# Patient Record
Sex: MALE | Race: WHITE | NOT HISPANIC OR LATINO | URBAN - METROPOLITAN AREA
[De-identification: names, ages, dates, MRNs, and addresses within clinical notes are randomized per-mention and may not be internally consistent; named-entity substitution may affect disease eponyms.]

---

## 2022-06-04 ENCOUNTER — EMERGENCY (EMERGENCY)
Facility: HOSPITAL | Age: 22
LOS: 0 days | Discharge: HOME | End: 2022-06-04
Attending: EMERGENCY MEDICINE
Payer: COMMERCIAL

## 2022-06-04 VITALS
RESPIRATION RATE: 18 BRPM | OXYGEN SATURATION: 100 % | SYSTOLIC BLOOD PRESSURE: 130 MMHG | HEART RATE: 64 BPM | TEMPERATURE: 98 F | DIASTOLIC BLOOD PRESSURE: 69 MMHG

## 2022-06-04 VITALS — HEIGHT: 73 IN | WEIGHT: 220.02 LBS

## 2022-06-04 DIAGNOSIS — R51.9 HEADACHE, UNSPECIFIED: ICD-10-CM

## 2022-06-04 DIAGNOSIS — R00.2 PALPITATIONS: ICD-10-CM

## 2022-06-04 DIAGNOSIS — Z87.891 PERSONAL HISTORY OF NICOTINE DEPENDENCE: ICD-10-CM

## 2022-06-04 DIAGNOSIS — R42 DIZZINESS AND GIDDINESS: ICD-10-CM

## 2022-06-04 LAB
ALBUMIN SERPL ELPH-MCNC: 4.4 G/DL — SIGNIFICANT CHANGE UP (ref 3.3–5)
ALP SERPL-CCNC: 93 U/L — SIGNIFICANT CHANGE UP (ref 40–120)
ALT FLD-CCNC: 22 U/L — SIGNIFICANT CHANGE UP (ref 12–78)
ANION GAP SERPL CALC-SCNC: 5 MMOL/L — SIGNIFICANT CHANGE UP (ref 5–17)
AST SERPL-CCNC: 16 U/L — SIGNIFICANT CHANGE UP (ref 15–37)
BASOPHILS # BLD AUTO: 0.05 K/UL — SIGNIFICANT CHANGE UP (ref 0–0.2)
BASOPHILS NFR BLD AUTO: 0.4 % — SIGNIFICANT CHANGE UP (ref 0–2)
BILIRUB SERPL-MCNC: 0.9 MG/DL — SIGNIFICANT CHANGE UP (ref 0.2–1.2)
BUN SERPL-MCNC: 11 MG/DL — SIGNIFICANT CHANGE UP (ref 7–23)
CALCIUM SERPL-MCNC: 9.7 MG/DL — SIGNIFICANT CHANGE UP (ref 8.5–10.1)
CHLORIDE SERPL-SCNC: 105 MMOL/L — SIGNIFICANT CHANGE UP (ref 96–108)
CO2 SERPL-SCNC: 27 MMOL/L — SIGNIFICANT CHANGE UP (ref 22–31)
CREAT SERPL-MCNC: 0.68 MG/DL — SIGNIFICANT CHANGE UP (ref 0.5–1.3)
EGFR: 137 ML/MIN/1.73M2 — SIGNIFICANT CHANGE UP
EOSINOPHIL # BLD AUTO: 0.08 K/UL — SIGNIFICANT CHANGE UP (ref 0–0.5)
EOSINOPHIL NFR BLD AUTO: 0.7 % — SIGNIFICANT CHANGE UP (ref 0–6)
GLUCOSE SERPL-MCNC: 74 MG/DL — SIGNIFICANT CHANGE UP (ref 70–99)
HCT VFR BLD CALC: 44.4 % — SIGNIFICANT CHANGE UP (ref 39–50)
HGB BLD-MCNC: 15.3 G/DL — SIGNIFICANT CHANGE UP (ref 13–17)
IMM GRANULOCYTES NFR BLD AUTO: 0.4 % — SIGNIFICANT CHANGE UP (ref 0–1.5)
LYMPHOCYTES # BLD AUTO: 2.51 K/UL — SIGNIFICANT CHANGE UP (ref 1–3.3)
LYMPHOCYTES # BLD AUTO: 22.2 % — SIGNIFICANT CHANGE UP (ref 13–44)
MAGNESIUM SERPL-MCNC: 2.3 MG/DL — SIGNIFICANT CHANGE UP (ref 1.6–2.6)
MCHC RBC-ENTMCNC: 29.9 PG — SIGNIFICANT CHANGE UP (ref 27–34)
MCHC RBC-ENTMCNC: 34.5 GM/DL — SIGNIFICANT CHANGE UP (ref 32–36)
MCV RBC AUTO: 86.7 FL — SIGNIFICANT CHANGE UP (ref 80–100)
MONOCYTES # BLD AUTO: 1.03 K/UL — HIGH (ref 0–0.9)
MONOCYTES NFR BLD AUTO: 9.1 % — SIGNIFICANT CHANGE UP (ref 2–14)
NEUTROPHILS # BLD AUTO: 7.58 K/UL — HIGH (ref 1.8–7.4)
NEUTROPHILS NFR BLD AUTO: 67.2 % — SIGNIFICANT CHANGE UP (ref 43–77)
PLATELET # BLD AUTO: 314 K/UL — SIGNIFICANT CHANGE UP (ref 150–400)
POTASSIUM SERPL-MCNC: 4.1 MMOL/L — SIGNIFICANT CHANGE UP (ref 3.5–5.3)
POTASSIUM SERPL-SCNC: 4.1 MMOL/L — SIGNIFICANT CHANGE UP (ref 3.5–5.3)
PROT SERPL-MCNC: 7.7 GM/DL — SIGNIFICANT CHANGE UP (ref 6–8.3)
RBC # BLD: 5.12 M/UL — SIGNIFICANT CHANGE UP (ref 4.2–5.8)
RBC # FLD: 12.4 % — SIGNIFICANT CHANGE UP (ref 10.3–14.5)
SODIUM SERPL-SCNC: 137 MMOL/L — SIGNIFICANT CHANGE UP (ref 135–145)
WBC # BLD: 11.29 K/UL — HIGH (ref 3.8–10.5)
WBC # FLD AUTO: 11.29 K/UL — HIGH (ref 3.8–10.5)

## 2022-06-04 PROCEDURE — 80053 COMPREHEN METABOLIC PANEL: CPT

## 2022-06-04 PROCEDURE — 70450 CT HEAD/BRAIN W/O DYE: CPT | Mod: 26,MA

## 2022-06-04 PROCEDURE — 93010 ELECTROCARDIOGRAM REPORT: CPT

## 2022-06-04 PROCEDURE — 70450 CT HEAD/BRAIN W/O DYE: CPT | Mod: MA

## 2022-06-04 PROCEDURE — 93005 ELECTROCARDIOGRAM TRACING: CPT

## 2022-06-04 PROCEDURE — 85025 COMPLETE CBC W/AUTO DIFF WBC: CPT

## 2022-06-04 PROCEDURE — 99285 EMERGENCY DEPT VISIT HI MDM: CPT | Mod: 25

## 2022-06-04 PROCEDURE — 83735 ASSAY OF MAGNESIUM: CPT

## 2022-06-04 PROCEDURE — 36415 COLL VENOUS BLD VENIPUNCTURE: CPT

## 2022-06-04 PROCEDURE — 96374 THER/PROPH/DIAG INJ IV PUSH: CPT

## 2022-06-04 PROCEDURE — 99285 EMERGENCY DEPT VISIT HI MDM: CPT

## 2022-06-04 RX ORDER — SODIUM CHLORIDE 9 MG/ML
1000 INJECTION INTRAMUSCULAR; INTRAVENOUS; SUBCUTANEOUS ONCE
Refills: 0 | Status: COMPLETED | OUTPATIENT
Start: 2022-06-04 | End: 2022-06-04

## 2022-06-04 RX ORDER — KETOROLAC TROMETHAMINE 30 MG/ML
15 SYRINGE (ML) INJECTION ONCE
Refills: 0 | Status: DISCONTINUED | OUTPATIENT
Start: 2022-06-04 | End: 2022-06-04

## 2022-06-04 RX ADMIN — SODIUM CHLORIDE 2000 MILLILITER(S): 9 INJECTION INTRAMUSCULAR; INTRAVENOUS; SUBCUTANEOUS at 18:37

## 2022-06-04 RX ADMIN — Medication 15 MILLIGRAM(S): at 18:37

## 2022-06-04 NOTE — ED STATDOCS - OBJECTIVE STATEMENT
22 yo male w/ no pertinent PMHx presents to the ED c/o H x7days. Pt states that the pain is at the top of his head. Pt went to  yesterday and was told to come to the ED for evaluation. Denies nausea, vomiting, vision changes. Pt states he is lightheaded for a few months and has palpitations. No other complaints at this time. Pt smokes marijuana. Pt hasn't been taking anything for his HA. Pt used to vape nicotine but quit a month ago. Recreational EtOH use. PCP: Dr. Shaikh

## 2022-06-04 NOTE — ED STATDOCS - ATTENDING APP SHARED VISIT CONTRIBUTION OF CARE
I, Ankita Matos MD,  performed the initial face to face bedside interview with this patient regarding history of present illness, review of symptoms and relevant past medical, social and family history.  I completed an independent physical examination.  I was the initial provider who evaluated this patient.   I personally saw the patient and performed a substantive portion of the visit including all aspects of the medical decision making.  I have signed out the follow up of any pending tests (i.e. labs, radiological studies) to the [facility].  I have communicated the patient’s plan of care and disposition with the [facility].  The history, relevant review of systems, past medical and surgical history, medical decision making, and physical examination was documented by the scribe in my presence and I attest to the accuracy of the documentation.

## 2022-06-04 NOTE — ED STATDOCS - PATIENT PORTAL LINK FT
You can access the FollowMyHealth Patient Portal offered by [facility] by registering at the following website: [email or web address]. By joining [employer]’s FollowMyHealth portal, you will also be able to view your health information using other applications (apps) compatible with our system.

## 2022-06-04 NOTE — ED STATDOCS - PHYSICAL EXAMINATION
PA NOTE: GEN: AOX3, NAD. HEENT: Throat clear. Airway is patent. EYES: PERRLA. EOMI. Head: NC/AT. NECK: Supple, No JVD. FROM. C-spine non-tender. CV:S1S2, RRR, LUNGS: Non-labored breathing, no tachypnea. O2sat 100% RA. CTA b/l. No w/r/r. CHEST: Equal chest expansion and rise. No deformity. ABD: Soft, NT/ND, no rebound, no guarding. No CVAT. EXT: No e/c/c. 2+ distal pulses. SKIN: No rashes. NEURO: No focal deficits. CN II-XII intact. FROM. 5/5 motor and sensory. ~Santiago Velez PA-C

## 2022-06-04 NOTE — ED STATDOCS - NS ED ATTENDING STATEMENT MOD
This was a shared visit with the [facility]. I reviewed and verified the documentation and independently performed the documented:

## 2022-06-04 NOTE — ED STATDOCS - CLINICAL SUMMARY MEDICAL DECISION MAKING FREE TEXT BOX
Imaging, meds, reeval. Imaging, meds, reeval.    PA note: CT head NEG. All labwork/radiology results discussed in detail with patient. Patient re-examined and re-evaluated. Patient feels much better at this time. ED evaluation, Diagnosis and management discussed with the patient in detail. Workup results discussed with ED attending, [facility]. Close  NEURO MD follow up encouraged, aftercare to assist with scheduling appointment ASAP. Strict ED return instructions discussed in detail and patient given the opportunity to ask any questions about their discharge diagnosis and instructions. Patient verbalized understanding. ~ Santiago Velez PA-C

## 2022-06-04 NOTE — ED STATDOCS - PROGRESS NOTE DETAILS
PA: Patient is a 20 yo male with no pertinent PMHx who presents to [facility] c/o HA x 1 week. Pt went to  yesterday and was told to come to the ED for evaluation. Denies nausea, vomiting, vision changes. No other complaints at this time. Pt smokes marijuana. Recreational EtOH use. ~Santiago Velez PA-C PA note: CT head NEG. All labwork/radiology results discussed in detail with patient. Patient re-examined and re-evaluated. Patient feels much better at this time. ED evaluation, Diagnosis and management discussed with the patient in detail. Workup results discussed with ED attending, [facility] to [facility] home. Close  NEURO MD follow up encouraged, aftercare to assist with scheduling appointment ASAP. Strict ED return instructions discussed in detail and patient given the opportunity to ask any questions about their discharge diagnosis and instructions. Patient verbalized understanding. ~ Santiago Velez PA-C

## 2022-06-04 NOTE — ED STATDOCS - CARE PROVIDER_API CALL
David Martinez)  Neurology  [address], [address]  [place] [address]  Phone: [phone]  Fax: [phone]  Follow Up Time: Urgent

## 2022-06-04 NOTE — ED ADULT TRIAGE NOTE - CHIEF COMPLAINT QUOTE
Pt. to the ED C/O Head Pressure x 1 week - Pt. states he was evaluated at  x 4 days ago and sent to ED for CT Scan. Pt. states " I was too busy to come" -- Pt. also reports waiting at  yesterday for 8 Hours- Pt. states Head only hurts when he touches it- denies major medical  hx

## 2024-06-09 ENCOUNTER — EMERGENCY (EMERGENCY)
Facility: HOSPITAL | Age: 24
LOS: 0 days | Discharge: HOME | End: 2024-06-09
Attending: EMERGENCY MEDICINE
Payer: COMMERCIAL

## 2024-06-09 VITALS
OXYGEN SATURATION: 98 % | DIASTOLIC BLOOD PRESSURE: 72 MMHG | TEMPERATURE: 98 F | HEART RATE: 55 BPM | SYSTOLIC BLOOD PRESSURE: 138 MMHG | RESPIRATION RATE: 18 BRPM

## 2024-06-09 VITALS
DIASTOLIC BLOOD PRESSURE: 115 MMHG | OXYGEN SATURATION: 98 % | SYSTOLIC BLOOD PRESSURE: 142 MMHG | HEART RATE: 95 BPM | RESPIRATION RATE: 20 BRPM | WEIGHT: 235.45 LBS | TEMPERATURE: 99 F

## 2024-06-09 DIAGNOSIS — K61.0 ANAL ABSCESS: ICD-10-CM

## 2024-06-09 DIAGNOSIS — K61.1 RECTAL ABSCESS: ICD-10-CM

## 2024-06-09 DIAGNOSIS — K62.89 OTHER SPECIFIED DISEASES OF ANUS AND RECTUM: ICD-10-CM

## 2024-06-09 LAB
ABO RH CONFIRMATION: SIGNIFICANT CHANGE UP
ALBUMIN SERPL ELPH-MCNC: 4.6 G/DL — SIGNIFICANT CHANGE UP (ref 3.3–5)
ALP SERPL-CCNC: 90 U/L — SIGNIFICANT CHANGE UP (ref 40–120)
ALT FLD-CCNC: 36 U/L — SIGNIFICANT CHANGE UP (ref 12–78)
ANION GAP SERPL CALC-SCNC: 5 MMOL/L — SIGNIFICANT CHANGE UP (ref 5–17)
APPEARANCE UR: CLEAR — SIGNIFICANT CHANGE UP
AST SERPL-CCNC: 24 U/L — SIGNIFICANT CHANGE UP (ref 15–37)
BASOPHILS # BLD AUTO: 0.03 K/UL — SIGNIFICANT CHANGE UP (ref 0–0.2)
BASOPHILS NFR BLD AUTO: 0.3 % — SIGNIFICANT CHANGE UP (ref 0–2)
BILIRUB SERPL-MCNC: 0.6 MG/DL — SIGNIFICANT CHANGE UP (ref 0.2–1.2)
BILIRUB UR-MCNC: NEGATIVE — SIGNIFICANT CHANGE UP
BLD GP AB SCN SERPL QL: SIGNIFICANT CHANGE UP
BUN SERPL-MCNC: 6 MG/DL — LOW (ref 7–23)
CALCIUM SERPL-MCNC: 9.6 MG/DL — SIGNIFICANT CHANGE UP (ref 8.5–10.1)
CHLORIDE SERPL-SCNC: 111 MMOL/L — HIGH (ref 96–108)
CO2 SERPL-SCNC: 25 MMOL/L — SIGNIFICANT CHANGE UP (ref 22–31)
COLOR SPEC: YELLOW — SIGNIFICANT CHANGE UP
CREAT SERPL-MCNC: 0.85 MG/DL — SIGNIFICANT CHANGE UP (ref 0.5–1.3)
DIFF PNL FLD: NEGATIVE — SIGNIFICANT CHANGE UP
EGFR: 127 ML/MIN/1.73M2 — SIGNIFICANT CHANGE UP
EOSINOPHIL # BLD AUTO: 0.02 K/UL — SIGNIFICANT CHANGE UP (ref 0–0.5)
EOSINOPHIL NFR BLD AUTO: 0.2 % — SIGNIFICANT CHANGE UP (ref 0–6)
GLUCOSE SERPL-MCNC: 91 MG/DL — SIGNIFICANT CHANGE UP (ref 70–99)
GLUCOSE UR QL: NEGATIVE MG/DL — SIGNIFICANT CHANGE UP
HCT VFR BLD CALC: 45.9 % — SIGNIFICANT CHANGE UP (ref 39–50)
HGB BLD-MCNC: 16.4 G/DL — SIGNIFICANT CHANGE UP (ref 13–17)
IMM GRANULOCYTES NFR BLD AUTO: 0.3 % — SIGNIFICANT CHANGE UP (ref 0–0.9)
KETONES UR-MCNC: ABNORMAL MG/DL
LACTATE SERPL-SCNC: 1.1 MMOL/L — SIGNIFICANT CHANGE UP (ref 0.7–2)
LEUKOCYTE ESTERASE UR-ACNC: NEGATIVE — SIGNIFICANT CHANGE UP
LIDOCAIN IGE QN: 26 U/L — SIGNIFICANT CHANGE UP (ref 13–75)
LYMPHOCYTES # BLD AUTO: 2.05 K/UL — SIGNIFICANT CHANGE UP (ref 1–3.3)
LYMPHOCYTES # BLD AUTO: 20.5 % — SIGNIFICANT CHANGE UP (ref 13–44)
MCHC RBC-ENTMCNC: 29.8 PG — SIGNIFICANT CHANGE UP (ref 27–34)
MCHC RBC-ENTMCNC: 35.7 GM/DL — SIGNIFICANT CHANGE UP (ref 32–36)
MCV RBC AUTO: 83.5 FL — SIGNIFICANT CHANGE UP (ref 80–100)
MONOCYTES # BLD AUTO: 0.72 K/UL — SIGNIFICANT CHANGE UP (ref 0–0.9)
MONOCYTES NFR BLD AUTO: 7.2 % — SIGNIFICANT CHANGE UP (ref 2–14)
NEUTROPHILS # BLD AUTO: 7.13 K/UL — SIGNIFICANT CHANGE UP (ref 1.8–7.4)
NEUTROPHILS NFR BLD AUTO: 71.5 % — SIGNIFICANT CHANGE UP (ref 43–77)
NITRITE UR-MCNC: NEGATIVE — SIGNIFICANT CHANGE UP
PH UR: 5 — SIGNIFICANT CHANGE UP (ref 5–8)
PLATELET # BLD AUTO: 397 K/UL — SIGNIFICANT CHANGE UP (ref 150–400)
POTASSIUM SERPL-MCNC: 3.9 MMOL/L — SIGNIFICANT CHANGE UP (ref 3.5–5.3)
POTASSIUM SERPL-SCNC: 3.9 MMOL/L — SIGNIFICANT CHANGE UP (ref 3.5–5.3)
PROT SERPL-MCNC: 8.1 GM/DL — SIGNIFICANT CHANGE UP (ref 6–8.3)
PROT UR-MCNC: NEGATIVE MG/DL — SIGNIFICANT CHANGE UP
RBC # BLD: 5.5 M/UL — SIGNIFICANT CHANGE UP (ref 4.2–5.8)
RBC # FLD: 12.1 % — SIGNIFICANT CHANGE UP (ref 10.3–14.5)
SODIUM SERPL-SCNC: 141 MMOL/L — SIGNIFICANT CHANGE UP (ref 135–145)
SP GR SPEC: >1.03 — HIGH (ref 1–1.03)
UROBILINOGEN FLD QL: 1 MG/DL — SIGNIFICANT CHANGE UP (ref 0.2–1)
WBC # BLD: 9.98 K/UL — SIGNIFICANT CHANGE UP (ref 3.8–10.5)
WBC # FLD AUTO: 9.98 K/UL — SIGNIFICANT CHANGE UP (ref 3.8–10.5)

## 2024-06-09 PROCEDURE — 74177 CT ABD & PELVIS W/CONTRAST: CPT | Mod: 26,MC

## 2024-06-09 PROCEDURE — 86901 BLOOD TYPING SEROLOGIC RH(D): CPT

## 2024-06-09 PROCEDURE — 86850 RBC ANTIBODY SCREEN: CPT

## 2024-06-09 PROCEDURE — 85025 COMPLETE CBC W/AUTO DIFF WBC: CPT

## 2024-06-09 PROCEDURE — 96374 THER/PROPH/DIAG INJ IV PUSH: CPT | Mod: XU

## 2024-06-09 PROCEDURE — 83690 ASSAY OF LIPASE: CPT

## 2024-06-09 PROCEDURE — 86900 BLOOD TYPING SEROLOGIC ABO: CPT

## 2024-06-09 PROCEDURE — 96375 TX/PRO/DX INJ NEW DRUG ADDON: CPT

## 2024-06-09 PROCEDURE — 83605 ASSAY OF LACTIC ACID: CPT

## 2024-06-09 PROCEDURE — 36415 COLL VENOUS BLD VENIPUNCTURE: CPT

## 2024-06-09 PROCEDURE — 74177 CT ABD & PELVIS W/CONTRAST: CPT | Mod: MC

## 2024-06-09 PROCEDURE — 87040 BLOOD CULTURE FOR BACTERIA: CPT

## 2024-06-09 PROCEDURE — 80053 COMPREHEN METABOLIC PANEL: CPT

## 2024-06-09 PROCEDURE — 99285 EMERGENCY DEPT VISIT HI MDM: CPT

## 2024-06-09 PROCEDURE — 81003 URINALYSIS AUTO W/O SCOPE: CPT

## 2024-06-09 PROCEDURE — 99284 EMERGENCY DEPT VISIT MOD MDM: CPT | Mod: 25

## 2024-06-09 RX ORDER — MORPHINE SULFATE 50 MG/1
4 CAPSULE, EXTENDED RELEASE ORAL ONCE
Refills: 0 | Status: DISCONTINUED | OUTPATIENT
Start: 2024-06-09 | End: 2024-06-09

## 2024-06-09 RX ORDER — PIPERACILLIN AND TAZOBACTAM 4; .5 G/20ML; G/20ML
3.38 INJECTION, POWDER, LYOPHILIZED, FOR SOLUTION INTRAVENOUS ONCE
Refills: 0 | Status: COMPLETED | OUTPATIENT
Start: 2024-06-09 | End: 2024-06-09

## 2024-06-09 RX ORDER — ONDANSETRON 8 MG/1
4 TABLET, FILM COATED ORAL ONCE
Refills: 0 | Status: COMPLETED | OUTPATIENT
Start: 2024-06-09 | End: 2024-06-09

## 2024-06-09 RX ORDER — SODIUM CHLORIDE 9 MG/ML
1000 INJECTION INTRAMUSCULAR; INTRAVENOUS; SUBCUTANEOUS ONCE
Refills: 0 | Status: COMPLETED | OUTPATIENT
Start: 2024-06-09 | End: 2024-06-09

## 2024-06-09 RX ADMIN — MORPHINE SULFATE 4 MILLIGRAM(S): 50 CAPSULE, EXTENDED RELEASE ORAL at 18:33

## 2024-06-09 RX ADMIN — ONDANSETRON 4 MILLIGRAM(S): 8 TABLET, FILM COATED ORAL at 18:32

## 2024-06-09 RX ADMIN — PIPERACILLIN AND TAZOBACTAM 200 GRAM(S): 4; .5 INJECTION, POWDER, LYOPHILIZED, FOR SOLUTION INTRAVENOUS at 18:32

## 2024-06-09 RX ADMIN — SODIUM CHLORIDE 1000 MILLILITER(S): 9 INJECTION INTRAMUSCULAR; INTRAVENOUS; SUBCUTANEOUS at 18:32

## 2024-06-09 NOTE — ED STATDOCS - ATTENDING APP SHARED VISIT CONTRIBUTION OF CARE
Dr. Blackwell: I performed a face to face bedside interview with patient regarding history of present illness, review of symptoms and past medical history. I completed an independent physical exam.  I have discussed patient's plan of care with PA.   I agree with note as stated above, having amended the EMR as needed to reflect my findings.   This includes HISTORY OF PRESENT ILLNESS, HIV, PAST MEDICAL/SURGICAL/FAMILY/SOCIAL HISTORY, ALLERGIES AND HOME MEDICATIONS, REVIEW OF SYSTEMS, PHYSICAL EXAM, and any PROGRESS NOTES during the time I functioned as the attending physician for this patient.

## 2024-06-09 NOTE — ED STATDOCS - CARE PROVIDER_API CALL
Bibiana Saavedra.  Colon/Rectal Surgery  [address] [place] [address]  Phone: [phone]  Fax: [phone]  Follow Up Time:

## 2024-06-09 NOTE — ED STATDOCS - PHYSICAL EXAMINATION
Gen:  Well appearing in NAD  Head:  NC/AT  HEENT: pupils perrl, no pharyngeal erythema, uvula midline  Cardiac: S1S2, RRR  Abd: Soft, non tender, at 6 O'clock position thee is erythema, fullness and fluctuance that goes into the anus approximately 4cm in length.  Resp: No distress, CTA   musculoskeletal: no deformities, no swelling, strength +5/+5  Skin: warm and dry as visualized, no rashes  Neuro: MELVIN, aao x 4  Psych: alert, cooperative, appropriate mood and affect for situation

## 2024-06-09 NOTE — ED ADULT TRIAGE NOTE - CHIEF COMPLAINT QUOTE
Ambulatory to triage c/o rectal pain due to an anal fissure diagnosed at urgent care. has been back to the urgent care three times and they keep telling him that it will heal on it's own. patient has been having bm with help of stool softeners, but is still very painful. painful to sit

## 2024-06-09 NOTE — ED ADULT NURSE NOTE - OBJECTIVE STATEMENT
Ambulatory to triage c/o rectal pain due to an anal fissure diagnosed at urgent care. has been back to the urgent care three times and they keep telling him that it will heal on it's own. patient has been having bm with help of stool softeners, but is still very painful. painful to sit no other complaints at this time pt is axo4 worked up in  and brought to rw

## 2024-06-09 NOTE — ED STATDOCS - OBJECTIVE STATEMENT
22 y/o male with no pertinent PMHx presenting to the ED c/o rectal pain due to an anal fissure diagnosed at urgent care. Pt has been back to the urgent care three times and they keep telling him that it will heal on it's own. After first visit pt was given cream (1% steroid), and told he had anal fissure on subsequent visit. Patient has been having bowel movements with help of stool softeners, but is still very painful. Pt reports it is painful to sit. Pt denies abdominal pain, nausea, vomiting, fever. Pt used inserted a finger into his anus ~1 month any may have aggravated fissure. Pt was on amoxacillin, and stopped it two week ago, before restarting. Pt has no known medical allergies.

## 2024-06-09 NOTE — ED STATDOCS - NSFOLLOWUPINSTRUCTIONS_ED_ALL_ED_FT
Anorectal Abscess  An abscess is an infected area that contains pus. An anorectal abscess is an abscess that forms near the opening of the butt (anus) or around the rectum. If it is not treated, the abscess can grow and cause other problems. These problems may include a more severe body-wide infection or pain, especially when you poop.    What are the causes?  An anorectal abscess is caused by clogged glands or an infection in:  The anus.  The area between the anus and the scrotum in males or between the anus and the vagina in females (perineum).  What increases the risk?  You may be more likely to get this condition if:  You are pregnant.  You have diabetes or an inflammatory bowel disease, such as Crohn's disease.  You have had anal sex.  You have certain cancers. These include rectal cancer, leukemia, or lymphoma.  You have been given medicines to kill cancer cells (chemotherapy).  You have cracks in your anus (anal fissures). These cracks may form if you have constipation that lasts for a long time or does not go away.  You have a sexually transmitted infection (STI).  What are the signs or symptoms?  The main symptom of this condition is pain. It may be a throbbing pain that gets worse when you poop. Other symptoms include:  Swelling, redness, and pus near the anus. The redness may go beyond the abscess and look like a red streak on the skin.  A painful lump or tissue near the anus.  Bleeding or pus-like discharge from the area.  Fever or night sweats.  Weakness and tiredness (fatigue).  Constipation or pain when pooping.  Pain in the lower part of your stomach.  How is this diagnosed?  This condition is diagnosed based on your medical history and a physical exam. The exam may include:  A digital rectal exam. This is when your health care provider puts a gloved finger into your anus and rectum to look for problems.  A vaginal exam. This is when your provider looks at the vagina for problems.  Using a tube with a light and camera on the end (scope) to look at the rectum.  You may also need tests, such as an MRI or CT scan.    How is this treated?  Treatment for this condition may include:  Incision and drainage of the abscess. This is when an incision is made over the abscess to drain the pus.  Medicines. These may include pain medicines, medicine to help you poop (laxatives), stool softeners, or antibiotics.  Follow these instructions at home:  Medicines    Take over-the-counter and prescription medicines only as told by your provider.  If you were prescribed antibiotics, use them as told by your provider. Do not stop using the antibiotic even if you start to feel better.  Ask your provider if the medicine prescribed to you requires you to avoid driving or using machinery.  Wound care    A normal puncture site compared to an infected puncture site. The infected puncture site has swelling and redness.  Follow instructions from your provider about how to take care of your wound. Make sure you:  Wash your hands with soap and water for at least 20 seconds before and after you change your bandage (dressing). If soap and water are not available, use hand .  Change your dressing as told by your provider.  Leave stitches (sutures), skin glue, or tape strips in place. These skin closures may need to stay in place for 2 weeks or longer. If tape strip edges start to loosen and curl up, you may trim the loose edges. Do not remove tape strips completely unless your provider tells you to do that.  If one or more tubes (drains) were placed to drain the pus, be careful not to pull at them. Your provider will tell you how long they need to stay in place.  Check your abscess area every day for signs of infection. Check for:  More redness, swelling, or pain.  More fluid or blood.  Warmth.  Pus or a bad smell.  Managing pain, stiffness, and swelling    Bag of ice on a towel on the skin.  To help with pain, try sitting:  On a heating pad with the setting on low.  On an inflatable donut-shaped cushion.  If told, put ice on the affected area.  Put ice in a plastic bag.  Place a towel between your skin and the bag.  Leave the ice on for 20 minutes, 2–3 times a day.  If your skin turns bright red, remove the ice right away to prevent skin damage. The risk of damage is higher if you cannot feel pain, heat, or cold.  General instructions    Take a sitz bath 3–4 times a day and after you poop. A sitz bath is a shallow, warm water bath that you sit down in. The water should only come up to your hips and should cover your butt.  Follow instructions from your provider about what you may eat and drink.  Keep all follow-up visits. Your provider may need to remove stitches and make sure that the abscess has gone away.  Where to find more information  American Society of Colon [employer] Rectal [employer]: fascrs.org  Contact a health care provider if:  You have any signs of an infection.  You have a fever or chills.  You have trouble pooping.  Get help right away if:  You have trouble moving or using your legs.  You have severe pain or pain that gets worse.  You have swelling that gets worse.  You have a lot more bleeding or passing of pus.  This information is not intended to replace advice given to you by your health care provider. Make sure you discuss any questions you have with your health care provider.

## 2024-06-09 NOTE — ED ADULT NURSE NOTE - NSFALLUNIVINTERV_ED_ALL_ED
Bed/Stretcher in lowest position, wheels locked, appropriate side rails in place/Call bell, personal items and telephone in reach/Instruct patient to call for assistance before getting out of bed/chair/stretcher/Non-slip footwear applied when patient is off stretcher/[place] to call system/Physically safe environment - no spills, clutter or unnecessary equipment/Purposeful proactive rounding/Room/bathroom lighting operational, light cord in reach

## 2024-06-09 NOTE — ED STATDOCS - PROGRESS NOTE DETAILS
Patient seen and evaluated, ED attending note and orders reviewed, will continue with patient follow up and care -Lorie Griffin PA-C Patient seen and evaluated by surgical resident incision and drainage performed per surgical team patient stable for discharge home on Augmentin x 7 days follow-up in office with Dr. Saavedra patient agreeable to discharge and plan of care return precautions given  Lorie Griffin PA-C

## 2024-06-09 NOTE — CONSULT NOTE ADULT - SUBJECTIVE AND OBJECTIVE BOX
HPI:  22 y/o male with no pertinent PMHx presenting to the ED c/o rectal pain due to an anal fissure diagnosed at urgent care. Pt has been back to the urgent care three times and they keep telling him that it will heal on it's own. After first visit pt was given cream (1% steroid), and told he had anal fissure on subsequent visit. Patient has been having bowel movements with help of stool softeners, but is still very painful. Pt reports it is painful to sit. Pt denies abdominal pain, nausea, vomiting, fever. Pt used inserted a finger into his anus ~1 month any may have aggravated fissure. Pt was on amoxacillin, and stopped it two week ago, before restarting. Pt has no known medical allergies.      PAST MEDICAL & SURGICAL HISTORY:  No pertinent past medical history      No significant past surgical history          MEDICATIONS  (STANDING):    MEDICATIONS  (PRN):      Allergies    No Known Allergies    Intolerances        SOCIAL HISTORY:    FAMILY HISTORY:          Physical Exam:  General: NAD, resting comfortably  HEENT: NC/AT, EOMI, normal hearing, no oral lesions, no LAD, neck supple  Pulmonary: normal resp effort, CTA-B  Cardiovascular: NSR, no murmurs  Abdominal: soft, ND/NT, no organomegaly  Rectum: Small subcentimeter left perianal abscess  Extremities: normal strength, no clubbing/cyanosis/edema  Neuro: A/O x 3, CNs II-XII grossly intact, normal sensation, no focal deficits  Pulses: palpable distal pulses    Vital Signs Last 24 Hrs  T(C): 37.2 (09 Jun 2024 14:11), Max: 37.2 (09 Jun 2024 14:11)  T(F): 99 (09 Jun 2024 14:11), Max: 99 (09 Jun 2024 14:11)  HR: 95 (09 Jun 2024 14:11) (95 - 95)  BP: 142/115 (09 Jun 2024 14:11) (142/115 - 142/115)  BP(mean): 125 (09 Jun 2024 14:11) (125 - 125)  RR: 20 (09 Jun 2024 14:11) (20 - 20)  SpO2: 98% (09 Jun 2024 14:11) (98% - 98%)    Parameters below as of 09 Jun 2024 14:11  Patient On (Oxygen Delivery Method): room air        I&O's Summary          LABS:                        16.4   9.98  )-----------( 397      ( 09 Jun 2024 16:42 )             45.9     06-09    141  |  111<H>  |  6<L>  ----------------------------<  91  3.9   |  25  |  0.85    Ca    9.6      09 Jun 2024 16:42    TPro  8.1  /  Alb  4.6  /  TBili  0.6  /  DBili  x   /  AST  24  /  ALT  36  /  AlkPhos  90  06-09      Urinalysis Basic - ( 09 Jun 2024 16:42 )    Color: Yellow / Appearance: Clear / SG: >1.030 / pH: x  Gluc: 91 mg/dL / Ketone: Trace mg/dL  / Bili: Negative / Urobili: 1.0 mg/dL   Blood: x / Protein: Negative mg/dL / Nitrite: Negative   Leuk Esterase: Negative / RBC: x / WBC x   Sq Epi: x / Non Sq Epi: x / Bacteria: x      CAPILLARY BLOOD GLUCOSE        LIVER FUNCTIONS - ( 09 Jun 2024 16:42 )  Alb: 4.6 g/dL / Pro: 8.1 gm/dL / ALK PHOS: 90 U/L / ALT: 36 U/L / AST: 24 U/L / GGT: x             Cultures:

## 2024-06-09 NOTE — ED STATDOCS - PATIENT PORTAL LINK FT
You can access the FollowMyHealth Patient Portal offered by [facility] by registering at the following website: [email or web address]. By joining [employer]’s FollowMyHealth portal, you will also be able to view your health information using other applications (apps) compatible with our system.

## 2024-06-09 NOTE — CONSULT NOTE ADULT - ASSESSMENT
23 M w/ left sided subcentimeter perianal abscess.   Actively draining.     Plan:   I&D performed  No packing necessary   D/c on Augmentin x 7 days   F/u w/ Dr. Bibiana Saavdera in the office in 1 week  Stool softeners, lidocaine jelly     Plan discussed with Dr. Saavedra

## 2024-06-09 NOTE — ED STATDOCS - CLINICAL SUMMARY MEDICAL DECISION MAKING FREE TEXT BOX
Pt with anal pain with anal fissure, in the and took antibiotics, over the past weeks pt has been experiencing worsening pain. Pt is able to have bowel movements and admits to inserting his finger into anus at least once. Plan for blood work, pain meds and CT-scan.

## 2024-06-10 PROBLEM — Z78.9 OTHER SPECIFIED HEALTH STATUS: Chronic | Status: ACTIVE | Noted: 2022-06-18

## 2024-06-14 LAB
CULTURE RESULTS: SIGNIFICANT CHANGE UP
CULTURE RESULTS: SIGNIFICANT CHANGE UP
SPECIMEN SOURCE: SIGNIFICANT CHANGE UP
SPECIMEN SOURCE: SIGNIFICANT CHANGE UP

## 2024-10-18 NOTE — ED ADULT TRIAGE NOTE - HEIGHT IN FEET
Writer called pt and let the pt know writer scheduled pt an appt for 10/31/24 while pt was inpatient. Appt scheduled and confirmed with pt for 10/31/24. Writer could not schedule pt in 1 week due to Dr. Brandt being in surgery on 10/25, and 10/29. Dr. Brandt will be out for Jury duty on 10/28, and 10/29.    6